# Patient Record
Sex: FEMALE | Race: WHITE | Employment: PART TIME | URBAN - METROPOLITAN AREA
[De-identification: names, ages, dates, MRNs, and addresses within clinical notes are randomized per-mention and may not be internally consistent; named-entity substitution may affect disease eponyms.]

---

## 2017-01-13 ENCOUNTER — ORTHOTIC/BRACE ENCOUNTER (OUTPATIENT)
Dept: ORTHOPEDIC SURGERY | Age: 47
End: 2017-01-13

## 2017-03-31 ENCOUNTER — OFFICE VISIT (OUTPATIENT)
Dept: ORTHOPEDIC SURGERY | Age: 47
End: 2017-03-31

## 2017-03-31 VITALS
DIASTOLIC BLOOD PRESSURE: 73 MMHG | HEART RATE: 65 BPM | HEIGHT: 64 IN | SYSTOLIC BLOOD PRESSURE: 115 MMHG | BODY MASS INDEX: 25.59 KG/M2 | WEIGHT: 149.91 LBS

## 2017-03-31 DIAGNOSIS — M21.611 BUNION OF GREAT TOE OF RIGHT FOOT: Primary | ICD-10-CM

## 2017-03-31 PROCEDURE — 99212 OFFICE O/P EST SF 10 MIN: CPT | Performed by: ORTHOPAEDIC SURGERY

## 2017-03-31 PROCEDURE — 73630 X-RAY EXAM OF FOOT: CPT | Performed by: ORTHOPAEDIC SURGERY

## 2017-09-21 ENCOUNTER — TELEPHONE (OUTPATIENT)
Dept: ORTHOPEDIC SURGERY | Age: 47
End: 2017-09-21

## 2017-09-22 ENCOUNTER — ORTHOTIC/BRACE ENCOUNTER (OUTPATIENT)
Dept: ORTHOPEDIC SURGERY | Age: 47
End: 2017-09-22

## 2019-05-03 ENCOUNTER — ORTHOTIC/BRACE ENCOUNTER (OUTPATIENT)
Dept: ORTHOPEDIC SURGERY | Age: 49
End: 2019-05-03
Payer: COMMERCIAL

## 2019-05-03 DIAGNOSIS — M19.079 ARTHRITIS OF FOOT: ICD-10-CM

## 2019-05-03 DIAGNOSIS — M77.42 METATARSALGIA OF BOTH FEET: Primary | ICD-10-CM

## 2019-05-03 DIAGNOSIS — M77.41 METATARSALGIA OF BOTH FEET: Primary | ICD-10-CM

## 2019-05-03 PROCEDURE — L3040 FT ARCH SUPRT PREMOLD LONGIT: HCPCS | Performed by: PEDORTHIST

## 2019-05-03 NOTE — PROGRESS NOTES
Took Bilateral negative impressions for new custom foot orthotics to support foot structure and redistribute pressures more appropriately. Advised patient she will be called when finished, she will be able to stop by and pick them up since she has multiple pairs that have worked well for her.